# Patient Record
Sex: MALE | Race: WHITE | URBAN - METROPOLITAN AREA
[De-identification: names, ages, dates, MRNs, and addresses within clinical notes are randomized per-mention and may not be internally consistent; named-entity substitution may affect disease eponyms.]

---

## 2021-10-14 ENCOUNTER — OFFICE VISIT (OUTPATIENT)
Dept: URBAN - METROPOLITAN AREA CLINIC 54 | Facility: CLINIC | Age: 51
End: 2021-10-14
Payer: COMMERCIAL

## 2021-10-14 ENCOUNTER — WEB ENCOUNTER (OUTPATIENT)
Dept: URBAN - METROPOLITAN AREA CLINIC 54 | Facility: CLINIC | Age: 51
End: 2021-10-14

## 2021-10-14 DIAGNOSIS — R94.5 ABNORMAL LFTS: ICD-10-CM

## 2021-10-14 DIAGNOSIS — E88.81 METABOLIC SYNDROME: ICD-10-CM

## 2021-10-14 DIAGNOSIS — E66.01 MORBID (SEVERE) OBESITY DUE TO EXCESS CALORIES: ICD-10-CM

## 2021-10-14 PROCEDURE — 99244 OFF/OP CNSLTJ NEW/EST MOD 40: CPT | Performed by: INTERNAL MEDICINE

## 2021-10-14 RX ORDER — ESCITALOPRAM OXALATE 20 MG/1
1 TABLET TABLET, FILM COATED ORAL ONCE A DAY
Status: ACTIVE | COMMUNITY

## 2021-10-14 RX ORDER — GABAPENTIN 300 MG/1
1 CAPSULE CAPSULE ORAL ONCE A DAY
Status: ACTIVE | COMMUNITY

## 2021-10-14 RX ORDER — HYDROCODONE BITARTRATE AND ACETAMINOPHEN 5; 325 MG/1; MG/1
1 TABLET AS NEEDED TABLET ORAL
Status: ACTIVE | COMMUNITY

## 2021-10-14 RX ORDER — AMLODIPINE BESYLATE 5 MG/1
1 TABLET TABLET ORAL ONCE A DAY
Status: ACTIVE | COMMUNITY

## 2021-10-14 NOTE — HPI-TODAY'S VISIT:
Patient is a 49 yo man referred by Dr. Gutierrez Ambrocio for above reasons. A copy of this document will be sent to referring provider. He has abnormal LFT's; , , , TB 0.4, AL 4.6, Cr 0.7  on two checks in 2021. Liver USG showed diffuse steatosis. He has no prior personal or family history of liver disease. Risk factors include heavy alcohol use over past several years (unable to quantify), weight gain x 30-40 lbs, HLD, HTN. He has no signs of advanced liver disease and no symptoms of portal hypertension. TG noted at 309. PLT count at 231. He has never had a liver biopsy. No advanced liver imaging on file. He has been using some form of cleanse to detoxify his liver and lose weight.

## 2021-10-14 NOTE — PHYSICAL EXAM CONSTITUTIONAL:
well developed, well nourished , in no acute distress , ambulating with walker with bilateral knee braces , normal communication ability

## 2021-10-14 NOTE — PHYSICAL EXAM GASTROINTESTINAL
Abdomen , soft, nontender, nondistended , obese, no guarding or rigidity , no masses palpable , normal bowel sounds , Liver and Spleen , no hepatomegaly present , no hepatosplenomegaly , liver nontender , spleen not palpable

## 2021-10-17 LAB
A/G RATIO: 1.5
ACTIN (SMOOTH MUSCLE) ANTIBODY: 5
ALBUMIN: 4.5
ALKALINE PHOSPHATASE: 110
ALPHA 2-MACROGLOBULINS, QN: 140
ALT (SGPT) P5P: 87
ALT (SGPT): 81
ANA DIRECT: NEGATIVE
APOLIPOPROTEIN A-1: 145
AST (SGOT) P5P: 96
AST (SGOT): 89
BASO (ABSOLUTE): 0.1
BASOS: 1
BILIRUBIN, TOTAL: 0.6
BILIRUBIN, TOTAL: 0.7
BUN/CREATININE RATIO: 18
BUN: 14
CALCIUM: 9.6
CARBON DIOXIDE, TOTAL: 22
CERULOPLASMIN: 30.4
CHLORIDE: 99
CHOLESTEROL, TOTAL: 265
COMMENT:: (no result)
CREATININE: 0.79
EGFR IF AFRICN AM: 121
EGFR IF NONAFRICN AM: 105
EOS (ABSOLUTE): 0.5
EOS: 4
FERRITIN, SERUM: 451
FIBROSIS SCORE: 0.2
FIBROSIS SCORING:: (no result)
FIBROSIS STAGE: (no result)
GGT: 176
GGT: 183
GLOBULIN, TOTAL: 3
GLUCOSE, SERUM: 110
GLUCOSE: 112
HAPTOGLOBIN: 272
HBSAG SCREEN: NEGATIVE
HEIGHT:: 60
HEMATOCRIT: 44.6
HEMATOLOGY COMMENTS:: (no result)
HEMOGLOBIN: 15.1
HEP A AB, TOTAL: NEGATIVE
HEP C VIRUS AB: <0.1
IMMATURE CELLS: (no result)
IMMATURE GRANS (ABS): 0.1
IMMATURE GRANULOCYTES: 1
INR: 1
INTERPRETATIONS:: (no result)
IRON BIND.CAP.(TIBC): 391
IRON SATURATION: 41
IRON: 161
LIMITATIONS:: (no result)
LYMPHS (ABSOLUTE): 2.3
LYMPHS: 18
MCH: 32.1
MCHC: 33.9
MCV: 95
MITOCHONDRIAL (M2) ANTIBODY: <20
MONOCYTES(ABSOLUTE): 0.8
MONOCYTES: 6
NASH GRADE: (no result)
NASH SCORE: 0.5
NASH SCORING: (no result)
NEUTROPHILS (ABSOLUTE): 9.3
NEUTROPHILS: 70
NRBC: (no result)
PLATELETS: 293
POTASSIUM: 3.6
PROTEIN, TOTAL: 7.5
PROTHROMBIN TIME: 10.3
RBC: 4.71
RDW: 11.9
SODIUM: 140
STEATOSIS GRADE: (no result)
STEATOSIS GRADING: (no result)
STEATOSIS SCORE: 0.89
TRIGLYCERIDES: 188
UIBC: 230
WBC: 13.1
WEIGHT:: 242

## 2021-10-18 PROBLEM — 408512008: Status: ACTIVE | Noted: 2021-10-14

## 2022-04-25 ENCOUNTER — OFFICE VISIT (OUTPATIENT)
Dept: URBAN - METROPOLITAN AREA CLINIC 54 | Facility: CLINIC | Age: 52
End: 2022-04-25

## 2022-04-25 RX ORDER — HYDROCODONE BITARTRATE AND ACETAMINOPHEN 5; 325 MG/1; MG/1
1 TABLET AS NEEDED TABLET ORAL
COMMUNITY

## 2022-04-25 RX ORDER — AMLODIPINE BESYLATE 5 MG/1
1 TABLET TABLET ORAL ONCE A DAY
COMMUNITY

## 2022-04-25 RX ORDER — ESCITALOPRAM OXALATE 20 MG/1
1 TABLET TABLET, FILM COATED ORAL ONCE A DAY
COMMUNITY

## 2022-04-25 RX ORDER — GABAPENTIN 300 MG/1
1 CAPSULE CAPSULE ORAL ONCE A DAY
COMMUNITY

## 2022-11-23 ENCOUNTER — OFFICE VISIT (OUTPATIENT)
Dept: URBAN - METROPOLITAN AREA CLINIC 86 | Facility: CLINIC | Age: 52
End: 2022-11-23

## 2022-12-23 ENCOUNTER — OFFICE VISIT (OUTPATIENT)
Dept: URBAN - METROPOLITAN AREA CLINIC 54 | Facility: CLINIC | Age: 52
End: 2022-12-23

## 2022-12-23 RX ORDER — GABAPENTIN 300 MG/1
1 CAPSULE CAPSULE ORAL ONCE A DAY
COMMUNITY

## 2022-12-23 RX ORDER — HYDROCODONE BITARTRATE AND ACETAMINOPHEN 5; 325 MG/1; MG/1
1 TABLET AS NEEDED TABLET ORAL
COMMUNITY

## 2022-12-23 RX ORDER — ESCITALOPRAM OXALATE 20 MG/1
1 TABLET TABLET, FILM COATED ORAL ONCE A DAY
COMMUNITY

## 2022-12-23 RX ORDER — AMLODIPINE BESYLATE 5 MG/1
1 TABLET TABLET ORAL ONCE A DAY
COMMUNITY

## 2023-02-20 ENCOUNTER — DASHBOARD ENCOUNTERS (OUTPATIENT)
Age: 53
End: 2023-02-20

## 2023-02-20 ENCOUNTER — OFFICE VISIT (OUTPATIENT)
Dept: URBAN - METROPOLITAN AREA CLINIC 54 | Facility: CLINIC | Age: 53
End: 2023-02-20
Payer: COMMERCIAL

## 2023-02-20 VITALS
SYSTOLIC BLOOD PRESSURE: 139 MMHG | WEIGHT: 241 LBS | TEMPERATURE: 97.4 F | BODY MASS INDEX: 47.32 KG/M2 | HEIGHT: 60 IN | DIASTOLIC BLOOD PRESSURE: 83 MMHG | HEART RATE: 97 BPM

## 2023-02-20 DIAGNOSIS — E88.81 METABOLIC SYNDROME: ICD-10-CM

## 2023-02-20 DIAGNOSIS — E66.01 MORBID (SEVERE) OBESITY DUE TO EXCESS CALORIES: ICD-10-CM

## 2023-02-20 DIAGNOSIS — K70.9 LIVER DISEASE DUE TO ALCOHOL: ICD-10-CM

## 2023-02-20 DIAGNOSIS — K76.0 FATTY LIVER: ICD-10-CM

## 2023-02-20 PROBLEM — 41309000: Status: ACTIVE | Noted: 2023-02-20

## 2023-02-20 PROBLEM — 237602007: Status: ACTIVE | Noted: 2021-10-14

## 2023-02-20 PROCEDURE — 76705 ECHO EXAM OF ABDOMEN: CPT | Performed by: INTERNAL MEDICINE

## 2023-02-20 PROCEDURE — 99214 OFFICE O/P EST MOD 30 MIN: CPT | Performed by: INTERNAL MEDICINE

## 2023-02-20 PROCEDURE — 76700 US EXAM ABDOM COMPLETE: CPT | Performed by: INTERNAL MEDICINE

## 2023-02-20 RX ORDER — AMLODIPINE BESYLATE 5 MG/1
1 TABLET TABLET ORAL ONCE A DAY
Status: ACTIVE | COMMUNITY

## 2023-02-20 RX ORDER — HYDROCODONE BITARTRATE AND ACETAMINOPHEN 5; 325 MG/1; MG/1
1 TABLET AS NEEDED TABLET ORAL
Status: ACTIVE | COMMUNITY

## 2023-02-20 RX ORDER — PRAVASTATIN SODIUM 20 MG/1
1 TABLET TABLET ORAL ONCE A DAY
Status: ACTIVE | COMMUNITY

## 2023-02-20 NOTE — HPI-TODAY'S VISIT:
Patient is a 51 yo man referred by Dr. Gutierrez Ambrocio for above reasons. A copy of this document will be sent to referring provider. He has abnormal LFT's; , , , TB 0.4, AL 4.6, Cr 0.7  on two checks in 2021. Liver USG showed diffuse steatosis. He has no prior personal or family history of liver disease. Risk factors include heavy alcohol use over past several years (unable to quantify), weight gain x 30-40 lbs, HLD, HTN. He has no signs of advanced liver disease and no symptoms of portal hypertension. TG noted at 309. PLT count at 231. He has never had a liver biopsy. No advanced liver imaging on file. He has been using some form of cleanse to detoxify his liver and lose weight.  2-20-23 Had umbilical hernia Was suppose to come in Dec but unable to r/s d/t loss of mother from illness.  Pt reports that he has hx of diverticular disease. Had marked edema

## 2023-02-21 ENCOUNTER — TELEPHONE ENCOUNTER (OUTPATIENT)
Dept: URBAN - METROPOLITAN AREA CLINIC 54 | Facility: CLINIC | Age: 53
End: 2023-02-21

## 2023-02-22 PROBLEM — 197321007: Status: ACTIVE | Noted: 2021-10-18

## 2023-02-22 PROBLEM — 83911000119104: Status: ACTIVE | Noted: 2021-10-14

## 2023-03-08 ENCOUNTER — TELEPHONE ENCOUNTER (OUTPATIENT)
Dept: URBAN - METROPOLITAN AREA CLINIC 54 | Facility: CLINIC | Age: 53
End: 2023-03-08

## 2023-03-08 RX ORDER — AMOXICILLIN AND CLAVULANATE POTASSIUM 500; 125 MG/1; 1/1
1 TABLET TABLET, FILM COATED ORAL
Qty: 21 | OUTPATIENT
Start: 2023-03-09 | End: 2023-03-16

## 2023-04-05 ENCOUNTER — OFFICE VISIT (OUTPATIENT)
Dept: URBAN - METROPOLITAN AREA CLINIC 53 | Facility: CLINIC | Age: 53
End: 2023-04-05